# Patient Record
Sex: MALE | Race: WHITE | Employment: FULL TIME | URBAN - METROPOLITAN AREA
[De-identification: names, ages, dates, MRNs, and addresses within clinical notes are randomized per-mention and may not be internally consistent; named-entity substitution may affect disease eponyms.]

---

## 2017-11-25 ENCOUNTER — ANESTHESIA EVENT (OUTPATIENT)
Dept: OPERATING ROOM | Age: 42
End: 2017-11-25
Payer: COMMERCIAL

## 2017-11-27 ENCOUNTER — ANESTHESIA (OUTPATIENT)
Dept: OPERATING ROOM | Age: 42
End: 2017-11-27
Payer: COMMERCIAL

## 2017-11-27 ENCOUNTER — HOSPITAL ENCOUNTER (OUTPATIENT)
Age: 42
Setting detail: OUTPATIENT SURGERY
Discharge: HOME | End: 2017-11-27
Attending: SURGERY | Admitting: SURGERY
Payer: COMMERCIAL

## 2017-11-27 VITALS — TEMPERATURE: 96.4 F | DIASTOLIC BLOOD PRESSURE: 57 MMHG | OXYGEN SATURATION: 97 % | SYSTOLIC BLOOD PRESSURE: 100 MMHG

## 2017-11-27 VITALS
WEIGHT: 212 LBS | DIASTOLIC BLOOD PRESSURE: 78 MMHG | TEMPERATURE: 97.1 F | RESPIRATION RATE: 16 BRPM | HEART RATE: 87 BPM | SYSTOLIC BLOOD PRESSURE: 128 MMHG | OXYGEN SATURATION: 95 % | HEIGHT: 72 IN | BODY MASS INDEX: 28.71 KG/M2

## 2017-11-27 PROCEDURE — C1781 MESH (IMPLANTABLE): HCPCS | Performed by: SURGERY

## 2017-11-27 PROCEDURE — 7100000030 HC ASPR PHASE II RECOVERY - FIRST 15 MIN: Performed by: SURGERY

## 2017-11-27 PROCEDURE — 2580000003 HC RX 258: Performed by: NURSE ANESTHETIST, CERTIFIED REGISTERED

## 2017-11-27 PROCEDURE — 6360000002 HC RX W HCPCS: Performed by: NURSE ANESTHETIST, CERTIFIED REGISTERED

## 2017-11-27 PROCEDURE — 7100000001 HC PACU RECOVERY - ADDTL 15 MIN: Performed by: SURGERY

## 2017-11-27 PROCEDURE — 7100000031 HC ASPR PHASE II RECOVERY - ADDTL 15 MIN: Performed by: SURGERY

## 2017-11-27 PROCEDURE — 88302 TISSUE EXAM BY PATHOLOGIST: CPT

## 2017-11-27 PROCEDURE — 6370000000 HC RX 637 (ALT 250 FOR IP): Performed by: ANESTHESIOLOGY

## 2017-11-27 PROCEDURE — 6360000002 HC RX W HCPCS: Performed by: ANESTHESIOLOGY

## 2017-11-27 PROCEDURE — A6258 TRANSPARENT FILM >16<=48 IN: HCPCS | Performed by: SURGERY

## 2017-11-27 PROCEDURE — 3600000002 HC SURGERY LEVEL 2 BASE: Performed by: SURGERY

## 2017-11-27 PROCEDURE — 3700000001 HC ADD 15 MINUTES (ANESTHESIA): Performed by: SURGERY

## 2017-11-27 PROCEDURE — A6402 STERILE GAUZE <= 16 SQ IN: HCPCS | Performed by: SURGERY

## 2017-11-27 PROCEDURE — 3700000000 HC ANESTHESIA ATTENDED CARE: Performed by: SURGERY

## 2017-11-27 PROCEDURE — 3600000012 HC SURGERY LEVEL 2 ADDTL 15MIN: Performed by: SURGERY

## 2017-11-27 PROCEDURE — 2500000003 HC RX 250 WO HCPCS: Performed by: SURGERY

## 2017-11-27 PROCEDURE — 2500000003 HC RX 250 WO HCPCS: Performed by: NURSE ANESTHETIST, CERTIFIED REGISTERED

## 2017-11-27 PROCEDURE — 2580000003 HC RX 258: Performed by: ANESTHESIOLOGY

## 2017-11-27 PROCEDURE — 7100000000 HC PACU RECOVERY - FIRST 15 MIN: Performed by: SURGERY

## 2017-11-27 DEVICE — MESH SURG DIA4.6CM POLY PGLA CLLGN FLM RIG ABSRB EXP SEMI: Type: IMPLANTABLE DEVICE | Site: UMBILICAL | Status: FUNCTIONAL

## 2017-11-27 RX ORDER — LIDOCAINE HYDROCHLORIDE 10 MG/ML
INJECTION, SOLUTION EPIDURAL; INFILTRATION; INTRACAUDAL; PERINEURAL PRN
Status: DISCONTINUED | OUTPATIENT
Start: 2017-11-27 | End: 2017-11-27 | Stop reason: SDUPTHER

## 2017-11-27 RX ORDER — MORPHINE SULFATE 10 MG/ML
1 INJECTION, SOLUTION INTRAMUSCULAR; INTRAVENOUS EVERY 5 MIN PRN
Status: DISCONTINUED | OUTPATIENT
Start: 2017-11-27 | End: 2017-11-27 | Stop reason: HOSPADM

## 2017-11-27 RX ORDER — MIDAZOLAM HYDROCHLORIDE 1 MG/ML
INJECTION INTRAMUSCULAR; INTRAVENOUS PRN
Status: DISCONTINUED | OUTPATIENT
Start: 2017-11-27 | End: 2017-11-27 | Stop reason: SDUPTHER

## 2017-11-27 RX ORDER — MEPERIDINE HYDROCHLORIDE 50 MG/ML
12.5 INJECTION INTRAMUSCULAR; INTRAVENOUS; SUBCUTANEOUS EVERY 5 MIN PRN
Status: DISCONTINUED | OUTPATIENT
Start: 2017-11-27 | End: 2017-11-27 | Stop reason: HOSPADM

## 2017-11-27 RX ORDER — LABETALOL HYDROCHLORIDE 5 MG/ML
5 INJECTION, SOLUTION INTRAVENOUS EVERY 10 MIN PRN
Status: DISCONTINUED | OUTPATIENT
Start: 2017-11-27 | End: 2017-11-27 | Stop reason: HOSPADM

## 2017-11-27 RX ORDER — FENTANYL CITRATE 50 UG/ML
50 INJECTION, SOLUTION INTRAMUSCULAR; INTRAVENOUS EVERY 5 MIN PRN
Status: DISCONTINUED | OUTPATIENT
Start: 2017-11-27 | End: 2017-11-27 | Stop reason: HOSPADM

## 2017-11-27 RX ORDER — ONDANSETRON 2 MG/ML
4 INJECTION INTRAMUSCULAR; INTRAVENOUS
Status: COMPLETED | OUTPATIENT
Start: 2017-11-27 | End: 2017-11-27

## 2017-11-27 RX ORDER — HYDRALAZINE HYDROCHLORIDE 20 MG/ML
5 INJECTION INTRAMUSCULAR; INTRAVENOUS EVERY 10 MIN PRN
Status: DISCONTINUED | OUTPATIENT
Start: 2017-11-27 | End: 2017-11-27 | Stop reason: HOSPADM

## 2017-11-27 RX ORDER — SODIUM CHLORIDE, SODIUM LACTATE, POTASSIUM CHLORIDE, CALCIUM CHLORIDE 600; 310; 30; 20 MG/100ML; MG/100ML; MG/100ML; MG/100ML
INJECTION, SOLUTION INTRAVENOUS CONTINUOUS PRN
Status: DISCONTINUED | OUTPATIENT
Start: 2017-11-27 | End: 2017-11-27 | Stop reason: SDUPTHER

## 2017-11-27 RX ORDER — DIPHENHYDRAMINE HYDROCHLORIDE 50 MG/ML
12.5 INJECTION INTRAMUSCULAR; INTRAVENOUS
Status: DISCONTINUED | OUTPATIENT
Start: 2017-11-27 | End: 2017-11-27 | Stop reason: HOSPADM

## 2017-11-27 RX ORDER — HYDROCODONE BITARTRATE AND ACETAMINOPHEN 5; 325 MG/1; MG/1
2 TABLET ORAL PRN
Status: DISCONTINUED | OUTPATIENT
Start: 2017-11-27 | End: 2017-11-27 | Stop reason: HOSPADM

## 2017-11-27 RX ORDER — METOCLOPRAMIDE HYDROCHLORIDE 5 MG/ML
10 INJECTION INTRAMUSCULAR; INTRAVENOUS
Status: DISCONTINUED | OUTPATIENT
Start: 2017-11-27 | End: 2017-11-27 | Stop reason: HOSPADM

## 2017-11-27 RX ORDER — DOCUSATE SODIUM 100 MG/1
100 CAPSULE, LIQUID FILLED ORAL 2 TIMES DAILY
Qty: 30 CAPSULE | Refills: 0 | Status: SHIPPED | OUTPATIENT
Start: 2017-11-27

## 2017-11-27 RX ORDER — SODIUM CHLORIDE 0.9 % (FLUSH) 0.9 %
10 SYRINGE (ML) INJECTION EVERY 12 HOURS SCHEDULED
Status: DISCONTINUED | OUTPATIENT
Start: 2017-11-27 | End: 2017-11-27 | Stop reason: HOSPADM

## 2017-11-27 RX ORDER — DEXAMETHASONE SODIUM PHOSPHATE 4 MG/ML
INJECTION, SOLUTION INTRA-ARTICULAR; INTRALESIONAL; INTRAMUSCULAR; INTRAVENOUS; SOFT TISSUE PRN
Status: DISCONTINUED | OUTPATIENT
Start: 2017-11-27 | End: 2017-11-27 | Stop reason: SDUPTHER

## 2017-11-27 RX ORDER — ONDANSETRON 2 MG/ML
INJECTION INTRAMUSCULAR; INTRAVENOUS PRN
Status: DISCONTINUED | OUTPATIENT
Start: 2017-11-27 | End: 2017-11-27 | Stop reason: SDUPTHER

## 2017-11-27 RX ORDER — SODIUM CHLORIDE, SODIUM LACTATE, POTASSIUM CHLORIDE, CALCIUM CHLORIDE 600; 310; 30; 20 MG/100ML; MG/100ML; MG/100ML; MG/100ML
INJECTION, SOLUTION INTRAVENOUS CONTINUOUS
Status: DISCONTINUED | OUTPATIENT
Start: 2017-11-27 | End: 2017-11-27 | Stop reason: HOSPADM

## 2017-11-27 RX ORDER — PROPOFOL 10 MG/ML
INJECTION, EMULSION INTRAVENOUS PRN
Status: DISCONTINUED | OUTPATIENT
Start: 2017-11-27 | End: 2017-11-27 | Stop reason: SDUPTHER

## 2017-11-27 RX ORDER — SCOLOPAMINE TRANSDERMAL SYSTEM 1 MG/1
1 PATCH, EXTENDED RELEASE TRANSDERMAL
Status: DISCONTINUED | OUTPATIENT
Start: 2017-11-27 | End: 2017-11-27 | Stop reason: HOSPADM

## 2017-11-27 RX ORDER — BUPIVACAINE HYDROCHLORIDE 5 MG/ML
INJECTION, SOLUTION EPIDURAL; INTRACAUDAL PRN
Status: DISCONTINUED | OUTPATIENT
Start: 2017-11-27 | End: 2017-11-27 | Stop reason: HOSPADM

## 2017-11-27 RX ORDER — OXYCODONE HYDROCHLORIDE AND ACETAMINOPHEN 5; 325 MG/1; MG/1
1 TABLET ORAL EVERY 6 HOURS PRN
Qty: 30 TABLET | Refills: 0 | Status: SHIPPED | OUTPATIENT
Start: 2017-11-27 | End: 2017-12-04

## 2017-11-27 RX ORDER — HYDROCODONE BITARTRATE AND ACETAMINOPHEN 5; 325 MG/1; MG/1
1 TABLET ORAL PRN
Status: DISCONTINUED | OUTPATIENT
Start: 2017-11-27 | End: 2017-11-27 | Stop reason: HOSPADM

## 2017-11-27 RX ORDER — CEFAZOLIN SODIUM 1 G/3ML
INJECTION, POWDER, FOR SOLUTION INTRAMUSCULAR; INTRAVENOUS PRN
Status: DISCONTINUED | OUTPATIENT
Start: 2017-11-27 | End: 2017-11-27 | Stop reason: SDUPTHER

## 2017-11-27 RX ORDER — HYDROMORPHONE HCL 110MG/55ML
0.5 PATIENT CONTROLLED ANALGESIA SYRINGE INTRAVENOUS EVERY 5 MIN PRN
Status: DISCONTINUED | OUTPATIENT
Start: 2017-11-27 | End: 2017-11-27 | Stop reason: HOSPADM

## 2017-11-27 RX ORDER — FENTANYL CITRATE 50 UG/ML
INJECTION, SOLUTION INTRAMUSCULAR; INTRAVENOUS PRN
Status: DISCONTINUED | OUTPATIENT
Start: 2017-11-27 | End: 2017-11-27 | Stop reason: SDUPTHER

## 2017-11-27 RX ORDER — FENTANYL CITRATE 50 UG/ML
25 INJECTION, SOLUTION INTRAMUSCULAR; INTRAVENOUS EVERY 5 MIN PRN
Status: DISCONTINUED | OUTPATIENT
Start: 2017-11-27 | End: 2017-11-27 | Stop reason: HOSPADM

## 2017-11-27 RX ORDER — SODIUM CHLORIDE 0.9 % (FLUSH) 0.9 %
10 SYRINGE (ML) INJECTION PRN
Status: DISCONTINUED | OUTPATIENT
Start: 2017-11-27 | End: 2017-11-27 | Stop reason: HOSPADM

## 2017-11-27 RX ADMIN — FENTANYL CITRATE 50 MCG: 50 INJECTION, SOLUTION INTRAMUSCULAR; INTRAVENOUS at 13:20

## 2017-11-27 RX ADMIN — SODIUM CHLORIDE, POTASSIUM CHLORIDE, SODIUM LACTATE AND CALCIUM CHLORIDE: 600; 310; 30; 20 INJECTION, SOLUTION INTRAVENOUS at 13:14

## 2017-11-27 RX ADMIN — FENTANYL CITRATE 25 MCG: 50 INJECTION, SOLUTION INTRAMUSCULAR; INTRAVENOUS at 14:36

## 2017-11-27 RX ADMIN — FENTANYL CITRATE 50 MCG: 50 INJECTION, SOLUTION INTRAMUSCULAR; INTRAVENOUS at 14:50

## 2017-11-27 RX ADMIN — PROPOFOL 200 MG: 10 INJECTION, EMULSION INTRAVENOUS at 13:16

## 2017-11-27 RX ADMIN — LIDOCAINE HYDROCHLORIDE 50 MG: 10 INJECTION, SOLUTION EPIDURAL; INFILTRATION; INTRACAUDAL; PERINEURAL at 13:16

## 2017-11-27 RX ADMIN — MIDAZOLAM 2 MG: 1 INJECTION INTRAMUSCULAR; INTRAVENOUS at 13:14

## 2017-11-27 RX ADMIN — CEFAZOLIN 2000 MG: 1 INJECTION, POWDER, FOR SOLUTION INTRAMUSCULAR; INTRAVENOUS at 13:22

## 2017-11-27 RX ADMIN — ONDANSETRON 4 MG: 2 INJECTION INTRAMUSCULAR; INTRAVENOUS at 13:44

## 2017-11-27 RX ADMIN — FENTANYL CITRATE 50 MCG: 50 INJECTION, SOLUTION INTRAMUSCULAR; INTRAVENOUS at 13:16

## 2017-11-27 RX ADMIN — DEXAMETHASONE SODIUM PHOSPHATE 4 MG: 4 INJECTION, SOLUTION INTRAMUSCULAR; INTRAVENOUS at 13:44

## 2017-11-27 RX ADMIN — SODIUM CHLORIDE, POTASSIUM CHLORIDE, SODIUM LACTATE AND CALCIUM CHLORIDE: 600; 310; 30; 20 INJECTION, SOLUTION INTRAVENOUS at 11:52

## 2017-11-27 RX ADMIN — ONDANSETRON 4 MG: 2 INJECTION INTRAMUSCULAR; INTRAVENOUS at 14:35

## 2017-11-27 ASSESSMENT — PAIN DESCRIPTION - PAIN TYPE
TYPE: SURGICAL PAIN
TYPE: SURGICAL PAIN

## 2017-11-27 ASSESSMENT — PAIN SCALES - GENERAL
PAINLEVEL_OUTOF10: 7
PAINLEVEL_OUTOF10: 5
PAINLEVEL_OUTOF10: 6

## 2017-11-27 ASSESSMENT — PAIN DESCRIPTION - LOCATION
LOCATION: ABDOMEN;UMBILICUS
LOCATION: UMBILICUS

## 2017-11-27 ASSESSMENT — PAIN - FUNCTIONAL ASSESSMENT: PAIN_FUNCTIONAL_ASSESSMENT: 0-10

## 2017-11-27 ASSESSMENT — PAIN DESCRIPTION - DESCRIPTORS: DESCRIPTORS: ACHING;DULL

## 2017-11-27 NOTE — ANESTHESIA PRE PROCEDURE
Department of Anesthesiology  Preprocedure Note       Name:  Killian Panda   Age:  43 y.o.  :  1975                                          MRN:  [id number]         Date:  2017      Surgeon: Rylan Lewis):  Vinicio Llanos DO    Procedure: Procedure(s): HERNIA UMBILICAL REPAIR W/MESH    Medications prior to admission:   Prior to Admission medications    Not on File       Current medications:    Current Facility-Administered Medications   Medication Dose Route Frequency Provider Last Rate Last Dose    lactated ringers infusion   Intravenous Continuous Adam Reyes  mL/hr at 17 1152      sodium chloride flush 0.9 % injection 10 mL  10 mL Intravenous 2 times per day Adam Reyes MD        sodium chloride flush 0.9 % injection 10 mL  10 mL Intravenous PRN Adam Reyes MD           Allergies: Allergies   Allergen Reactions    Gluten Meal     Motrin [Ibuprofen] Swelling       Problem List:  There is no problem list on file for this patient.       Past Medical History:        Diagnosis Date    Kidney stones     history of    Umbilical hernia        Past Surgical History:        Procedure Laterality Date    TONSILLECTOMY         Social History:    Social History   Substance Use Topics    Smoking status: Never Smoker    Smokeless tobacco: Never Used    Alcohol use No                                Counseling given: Not Answered      Vital Signs (Current):   Vitals:    17 1140   BP: 136/87   Pulse: 89   Resp: 18   Temp: 97 °F (36.1 °C)   SpO2: 96%   Weight: 212 lb (96.2 kg)   Height: 6' (1.829 m)                                              BP Readings from Last 3 Encounters:   17 136/87       NPO Status: Time of last liquid consumption: 2200                        Time of last solid consumption: 220                        Date of last liquid consumption: 17                        Date of last solid food consumption: 17    BMI:   Wt Readings from Last 3 Encounters:   17

## 2017-11-27 NOTE — H&P
HISTORY and [facility] [id number]       NAME:  Mendez Bergeron  MRN: [id number]   YOB: 1975   Date: 11/27/2017   Age: 43 y.o. Gender: male       COMPLAINT AND PRESENT HISTORY:   43 y o male with umbilical hernia. Patient had episode of apparent \"GI swelling\" about one month ago that was thought to be a reaction from Motrin which patient was taking because of strained neck muscle. There was major pain around the umbilicus. Pain lasted about 3 weeks and was about 7 on scale. Pain worse with moving or twisting and persists. Pain would wake him in the missle of the night. PAST MEDICAL HISTORY     Past Medical History:   Diagnosis Date    Kidney stones     history of    Umbilical hernia        Pt denies any history of Diabetes mellitus type 2, hypertension, stroke, heart disease, COPD, Asthma, GERD, HLD, Cancer, Seizures,Thyroid disease, Kidney Disease, Hepatitis, TB, Psychiatric Disorders or Substance abuse. SURGICAL HISTORY       Past Surgical History:   Procedure Laterality Date    TONSILLECTOMY         FAMILY HISTORY     History reviewed. No pertinent family history. SOCIAL HISTORY       Social History     Social History    Marital status:      Spouse name: N/A    Number of children: N/A    Years of education: N/A     Social History Main Topics    Smoking status: Never Smoker    Smokeless tobacco: Never Used    Alcohol use No    Drug use: No    Sexual activity: Not Asked     Other Topics Concern    None     Social History Narrative    None           REVIEW OF SYSTEMS      Allergies   Allergen Reactions    Gluten Meal     Motrin [Ibuprofen] Swelling       No current facility-administered medications on file prior to encounter. No current outpatient prescriptions on file prior to encounter. General health:  Fairly good. No fever or chills. Skin:  No itching, redness or rash.                  HEENT:  No headache, epistaxis no pedal edema. Analis sign negative. No discoloration or ulcerations. NEUROLOGIC:  The patient is conscious, alert, oriented, No apparent focal sensory or motor deficits. PROVISIONAL DIAGNOSES / SURGERY:      Umbilical hernia  Umbilical hernia repair W/ mesh    There are no active problems to display for this patient.           Tea Roy NP on 11/27/2017 at 12:04 PM

## 2017-11-27 NOTE — OP NOTE
OPERATIVE NOTE    DATE OF PROCEDURE: 11/27/2017     SURGEON:Jodie Whitfield DO    PREOPERATIVE DIAGNOSIS : initial umbilical  Hernia reducible    POSTOPERATIVE DIAGNOSIS: Same     OPERATION: umbilical hernia repair with mesh    ANESTHESIA: General anesthesia    ESTIMATED BLOOD LOSS:  less than 5     COMPLICATIONS: None. SPECIMENS:  Was Obtained: hernia and sac    HISTORY: The patient is a 43y.o. year old male with history of above preop diagnosis. I explained the risk, benefits, expected outcome, and alternatives to the procedure. Patient understands and is in agreement. PROCEDURE: The patient was given general anesthesia. The abdomen was prepped and draped in typical sterile fashion. Incision is made inferior to the umbilicus and dissection is taken down with electro bovie cautery to the hernia sac. The hernia sac is opened and removed and sent for specimen. The adhesions are taken down sharply. The parietex ventral hernia circular mesh small is dipped in saline and inserted into the preperitoneal space. The mesh is sutured into place with interrupted O-prolene suture. The fascia is closed over the mesh with 0-Vicryl in figure of 8 sutures The skin is closed with 4-0 Vicryl in interrupted fashion. Steri strips and a sterile dressing are applied. The patient is awakened and extubated and taken to the recovery room in stable condition.     Electronically signed by Jeromy Ortiz DO on 11/27/2017 at 2:03 PM

## 2017-11-28 LAB — SURGICAL PATHOLOGY REPORT: NORMAL

## 2019-02-19 ENCOUNTER — OFFICE VISIT (OUTPATIENT)
Dept: ORTHOPEDIC SURGERY | Age: 44
End: 2019-02-19
Payer: COMMERCIAL

## 2019-02-19 VITALS — HEIGHT: 72 IN | BODY MASS INDEX: 28.73 KG/M2 | WEIGHT: 212.08 LBS

## 2019-02-19 DIAGNOSIS — S52.572A OTHER CLOSED INTRA-ARTICULAR FRACTURE OF DISTAL END OF LEFT RADIUS, INITIAL ENCOUNTER: Primary | ICD-10-CM

## 2019-02-19 PROBLEM — S52.502A CLOSED FRACTURE OF LEFT DISTAL RADIUS: Status: ACTIVE | Noted: 2019-02-19

## 2019-02-19 PROCEDURE — 99203 OFFICE O/P NEW LOW 30 MIN: CPT | Performed by: ORTHOPAEDIC SURGERY

## 2019-02-19 PROCEDURE — 25600 CLTX DST RDL FX/EPHYS SEP WO: CPT | Performed by: ORTHOPAEDIC SURGERY

## 2019-02-19 RX ORDER — TRAMADOL HYDROCHLORIDE 50 MG/1
TABLET ORAL
Refills: 0 | COMMUNITY
Start: 2019-02-17

## 2019-02-25 DIAGNOSIS — S52.502D CLOSED FRACTURE OF DISTAL END OF LEFT RADIUS WITH ROUTINE HEALING, UNSPECIFIED FRACTURE MORPHOLOGY, SUBSEQUENT ENCOUNTER: Primary | ICD-10-CM

## 2019-02-26 ENCOUNTER — OFFICE VISIT (OUTPATIENT)
Dept: ORTHOPEDIC SURGERY | Age: 44
End: 2019-02-26

## 2019-02-26 VITALS — WEIGHT: 212.08 LBS | HEIGHT: 72 IN | BODY MASS INDEX: 28.73 KG/M2

## 2019-02-26 DIAGNOSIS — S52.532D CLOSED COLLES' FRACTURE OF LEFT RADIUS WITH ROUTINE HEALING, SUBSEQUENT ENCOUNTER: Primary | ICD-10-CM

## 2019-02-26 PROCEDURE — 99024 POSTOP FOLLOW-UP VISIT: CPT | Performed by: ORTHOPAEDIC SURGERY

## 2019-02-26 RX ORDER — TRAMADOL HYDROCHLORIDE 50 MG/1
50 TABLET ORAL EVERY 6 HOURS PRN
Qty: 20 TABLET | Refills: 0 | Status: SHIPPED | OUTPATIENT
Start: 2019-02-26 | End: 2019-03-03

## 2019-03-04 DIAGNOSIS — S62.102D CLOSED FRACTURE OF LEFT WRIST WITH ROUTINE HEALING, SUBSEQUENT ENCOUNTER: Primary | ICD-10-CM

## 2019-03-05 ENCOUNTER — OFFICE VISIT (OUTPATIENT)
Dept: ORTHOPEDIC SURGERY | Age: 44
End: 2019-03-05

## 2019-03-05 VITALS — HEIGHT: 72 IN | BODY MASS INDEX: 28.73 KG/M2 | WEIGHT: 212.08 LBS

## 2019-03-05 DIAGNOSIS — S52.532D CLOSED COLLES' FRACTURE OF LEFT RADIUS WITH ROUTINE HEALING, SUBSEQUENT ENCOUNTER: Primary | ICD-10-CM

## 2019-03-05 PROCEDURE — 99024 POSTOP FOLLOW-UP VISIT: CPT | Performed by: ORTHOPAEDIC SURGERY

## 2019-03-27 ENCOUNTER — OFFICE VISIT (OUTPATIENT)
Dept: ORTHOPEDIC SURGERY | Age: 44
End: 2019-03-27

## 2019-03-27 DIAGNOSIS — S62.102D CLOSED FRACTURE OF LEFT WRIST WITH ROUTINE HEALING, SUBSEQUENT ENCOUNTER: Primary | ICD-10-CM

## 2019-03-27 PROCEDURE — 99024 POSTOP FOLLOW-UP VISIT: CPT | Performed by: ORTHOPAEDIC SURGERY

## 2019-04-03 DIAGNOSIS — S62.102D CLOSED FRACTURE OF LEFT WRIST WITH ROUTINE HEALING, SUBSEQUENT ENCOUNTER: Primary | ICD-10-CM

## 2019-04-04 ENCOUNTER — OFFICE VISIT (OUTPATIENT)
Dept: ORTHOPEDIC SURGERY | Age: 44
End: 2019-04-04

## 2019-04-04 VITALS — HEIGHT: 72 IN | WEIGHT: 212.08 LBS | BODY MASS INDEX: 28.73 KG/M2

## 2019-04-04 DIAGNOSIS — S52.532D CLOSED COLLES' FRACTURE OF LEFT RADIUS WITH ROUTINE HEALING, SUBSEQUENT ENCOUNTER: Primary | ICD-10-CM

## 2019-04-04 PROCEDURE — 99024 POSTOP FOLLOW-UP VISIT: CPT | Performed by: ORTHOPAEDIC SURGERY

## 2019-04-04 NOTE — PROGRESS NOTES
HPI: Yolie Craft is a 40 y.o. old male who is approximately 6 weeks out from a  Closed left distal radius fracture. Was in a short arm cast for a period of about 3 weeks. He was seen last week by Dr. Lucille Maki is a result of this cast irritating him. He had the cast removed and was placed in a short arm removable wrist splint. He states that he feels much better. At this time he reports having mild pain primarily along the ulnar aspect of the wrist.  He denies having any numbness or tingling. [facility] Physical Exam:  Ht 6' 0.01\" (1.829 m)   Wt 212 lb 1.3 oz (96.2 kg)   BMI 28.76 kg/m²   General Appearance: alert, well appearing, and in no distress  Mental Status: alert, oriented to person, place, and time  Evaluation of the left wrist and upper extremity demonstrates moderate diffuse swelling about the wrist. Skin is intact without any warmth or erythema. he does have some mild tenderness to palpation on the dorsum of the wrist. Sensation is grossly intact to light touch diffusely, and he can actively flex, extend, abduct and adduct all his fingers. Imaging Studies: X-rays of the left wrist were obtained during his visit last week was reviewed independently today demonstrating maintained alignment of the left distal radius with no interval displacement. There appears to be consolidation across the fracture site. Impression and plan: Yolie Craft is a 40 y.o. old male who is approximately 6 weeks out from a closed left distal radius fracture. We've been treating this conservatively thus far and has been in a short arm cast for 3 weeks after being in the splint for 2. He has been in a removable wrist splint for the past week. As noted above he remains in acceptable alignment and there appears to be appropriate healing across the fracture. Consequently we'll continue with conservative management. To this end I'll have him remain in his removable splint for the next 6 weeks.  he may take this off for hygiene and to work on gradual progressive range of motion. he was set up with occupational therapy to facilitate this. he is to avoid any heavy lifting, pushing, or pulling. I'll have him follow-up in my clinic in 6 weeks for reevaluation but he was instructed to return or call earlier with any questions and/or concerns.

## 2019-05-01 DIAGNOSIS — S62.102D CLOSED FRACTURE OF LEFT WRIST WITH ROUTINE HEALING, SUBSEQUENT ENCOUNTER: Primary | ICD-10-CM

## 2019-05-13 ENCOUNTER — OFFICE VISIT (OUTPATIENT)
Dept: ORTHOPEDIC SURGERY | Age: 44
End: 2019-05-13

## 2019-05-13 DIAGNOSIS — S52.532D CLOSED COLLES' FRACTURE OF LEFT RADIUS WITH ROUTINE HEALING, SUBSEQUENT ENCOUNTER: Primary | ICD-10-CM

## 2019-05-13 PROCEDURE — 99024 POSTOP FOLLOW-UP VISIT: CPT | Performed by: ORTHOPAEDIC SURGERY

## 2024-01-03 ENCOUNTER — HOSPITAL ENCOUNTER (OUTPATIENT)
Age: 49
Setting detail: THERAPIES SERIES
Discharge: HOME | End: 2024-01-03
Payer: COMMERCIAL

## 2024-01-03 PROCEDURE — 97161 PT EVAL LOW COMPLEX 20 MIN: CPT

## 2024-01-03 PROCEDURE — 97110 THERAPEUTIC EXERCISES: CPT

## 2024-01-03 NOTE — CONSULTS
[facility]  [address], [address]  [place] [address]  [phone]  F: [phone]   Physical Therapy Spine Evaluation    Date:  1/3/2024  Patient: Fuad Neff  : 1975  MRN: [id number]  Physician: Bry Moura DO     Insurance: [employer] 60/60 VISITS $30.00 COPAYMENT   Medical Diagnosis: low back pain without sciatica M54.50 ADD neck pain M54.2  Rehab Codes: low back pain M54.5  Onset Date: referral 23  Next 's appt.: PRN after therapy  Visit Count:    Cancel/No Show: 0/0    Subjective:   Patient presents to therapy with complaints of low back pain with complaints of (L) sided SI and (L) sided gluteal symptoms.  Patient also previously noted complaints of symptoms down the (L) LE to the (L) anterior femur.  This occurred after a fall of a ladder 22- stated that he had neck issues with radicular symptoms down the arm as well as back pain that started around that time.  Fell from ladder ~ 3 rungs up, grabbed with the (R) arm and broke the rung he was standing on- ended up landing onto the (R) shoulder are per the patient.  Has had previous testing on the neck per the patient but has not had MRI or XRAYs on the low back.  Does note back had previous issue with degenerative issues L5-S1 ~15 years ago but has been controlled/managed.  Stated that the neck was so bad initially that he did not seek treatment for the back.  Now has been noting that the back has been getting worse and this prompted MD appointment and referral to therapy 23.  Also asked about treatment for the neck- which he has previously done- and requesting this be added to the POC.  CC: complaints of low back pain, (L) SI pain, (L) iliac pain; previously noted radicular symptoms to the (L) anterior thigh described as numbness  HPI: injury in 2022, referral to therapy 23    PMHx: [] Unremarkable [] Diabetes [] HTN  [] Pacemaker   [] MI/Heart Problems []

## 2024-01-08 ENCOUNTER — HOSPITAL ENCOUNTER (OUTPATIENT)
Age: 49
Setting detail: THERAPIES SERIES
Discharge: HOME | End: 2024-01-08
Payer: COMMERCIAL

## 2024-01-08 PROCEDURE — 97110 THERAPEUTIC EXERCISES: CPT

## 2024-01-08 PROCEDURE — 97140 MANUAL THERAPY 1/> REGIONS: CPT

## 2024-01-08 NOTE — FLOWSHEET NOTE
Demonstrates/verbalizes understanding of HEP/Ed previously given.     See exercise sheet for complete review of HEP 1/3/24 Jro    Plan: [x] Continue current frequency toward long and short term goals.    [x] Specific Instructions for subsequent treatments: progress postural stabilization program, work on gluteal and hip stretching program, work on postural education/awareness for lumbar spine     Frequency:  2-3 x/week for 12 visits       Time In: 1500            Time Out: 1540    Electronically signed by:  Jose Jack PTA

## 2024-01-12 ENCOUNTER — HOSPITAL ENCOUNTER (OUTPATIENT)
Age: 49
Setting detail: THERAPIES SERIES
Discharge: HOME | End: 2024-01-12
Payer: COMMERCIAL

## 2024-01-12 PROCEDURE — 97140 MANUAL THERAPY 1/> REGIONS: CPT

## 2024-01-12 PROCEDURE — 97110 THERAPEUTIC EXERCISES: CPT

## 2024-01-12 NOTE — FLOWSHEET NOTE
testing.    Gave patient more stretching based program due to possible (R) levator muscle issue; possible mild disc issue due to infrequent radicular symptoms    Precautions:          Today’s Treatment:    Modalities:     Precautions:  Exercises:  Exercise Reps/ Time Weight/ Level Comments             Postural education  5 min    Lumbar roll   Hamstring stretch supine 4 x 20\"       Calf stretch  4 x 20\"      Piriformis stretch supine 3 x 20\"       Bridge  x 10        Hook lying march  X 15   Hip flexion with knee extension TA iso   BRIDGES  X15 red    March supine  X15 red          Cervical side bend 3 x 20\"     Levator stretch 3 x 20\"     Doorway rhomboid stretch 3 x 20\"           Left hip PROM X 10   Asymptomatic with minimal ROM loss   STM left buttock/piriformis 3 min   Percussive  STM small ball intensity 2   L1-L5 CPA  10\" x 5   Grade IV    HEMANT 2 min   HELD TODAY-IRRITATES (R) NECK SYMPTOMS     Other:    Response to treatment:  Pt  evaluated cervical symptoms with signed POC from physician.  Presents as mild cervical derangement or muscle issue of (R) trapezius and (R) levator.  Patient given stretching program for neck as listed.  Tolerated these well.  Did lumbar program first and held the HEMANT due to increased (R) scapular and (R) levator symptoms with this.  Otherwise tolerated this program well including addition of supine stabilization exercises for the back.    Treatment Charges: Mins Units   []  Modalities     [x]  Ther Exercise 30 2   [x]  Manual Therapy 10 1   []  Ther Activities     []  Neuro Re-ed     []  Vasocompression     [] Gait     []  Other     Total billable time 40 3       Assessment: [x] Progressing toward goals.  Presents as cervical muscle issue and possible mild derangement of cervical spine.  Presents as possible L3 radicular issue with secondary (L) SI pain.    [] No change.     [] Other:  [x] Patient would continue to benefit from skilled physical therapy services in order to work on

## 2024-01-15 ENCOUNTER — APPOINTMENT (OUTPATIENT)
Age: 49
End: 2024-01-15
Payer: COMMERCIAL

## 2024-01-16 ENCOUNTER — HOSPITAL ENCOUNTER (OUTPATIENT)
Age: 49
Setting detail: THERAPIES SERIES
Discharge: HOME | End: 2024-01-16
Payer: COMMERCIAL

## 2024-01-16 PROCEDURE — 97140 MANUAL THERAPY 1/> REGIONS: CPT

## 2024-01-16 PROCEDURE — 97110 THERAPEUTIC EXERCISES: CPT

## 2024-01-16 NOTE — FLOWSHEET NOTE
[facility]  Therapy  [address], [address]  [place] [address]  [phone]  F: [phone]     Physical Therapy Daily Treatment Note    Date:  2024  Patient Name:  Fuad Neff    :  1975  MRN: [id number]  Physician: Bry Moura DO                                     Insurance: [facility] 60/60 VISITS $30.00 COPAYMENT   Medical Diagnosis: low back pain without sciatica M54.50 ADD neck pain M54.2             Rehab Codes: low back pain M54.5  Onset Date: referral 23             Next 's appt.: PRN after therapy    Visit# / total visits:     Cancels/No Shows: 0/0    Subjective:    Pain:  [x] Yes  [] No Location: left low back and buttock 5/10, ; (R) trapezius/levator  Pain Rating: (0-10 scale) 5/10  Pain altered Tx:  [x] No  [] Yes  Action:    Comments:   Pt states that his low back and right UE are more symptomatic due to slipping and falling on the ice yesterday.    Reached forward earlier today to put his right shoe on producing increased left low back and buttock pain.  Right UE sore from fall, sx are not described as neurological more muscular in nature. Up to those events sx were decreasing in intensity and frequency.     Objective:  decreased lumbar lordosis noted L1-L4 with minimal intervertebral movement.          CERVICAL TESTING:  Previously documented:  (B) rotation, (R) 70 degrees, (L) 70 degrees with (R) sided discomfort/difficulty due to (R) trap discomfort  SB (R) 30 degrees, (L) 25 degrees- (R) sided discomfort with (B) testing- (R) results in \"pinching\" in (R) trap area  Flexion 50 degrees mild pull  Extension 50 degrees end range pull (R) trap area    Repeated testing-   Repeated protrusion- results in \"crunch\" in scapular area  Repeated retraction- pain in (R) neck,   Repeated flexion- mild pull in (R) neck  Did not get radicular symptoms with testing.    Gave patient more stretching based program due to possible (R)

## 2024-01-19 ENCOUNTER — HOSPITAL ENCOUNTER (OUTPATIENT)
Age: 49
Setting detail: THERAPIES SERIES
Discharge: HOME | End: 2024-01-19
Payer: COMMERCIAL

## 2024-01-19 PROCEDURE — 97140 MANUAL THERAPY 1/> REGIONS: CPT

## 2024-01-19 PROCEDURE — 97110 THERAPEUTIC EXERCISES: CPT

## 2024-01-19 NOTE — FLOWSHEET NOTE
[facility]  [address]  [place] [address]  [phone]  F: [phone]     Physical Therapy Daily Treatment Note    Date:  2024  Patient Name:  Fuad Neff    :  1975  MRN: [id number]  Physician: Bry Moura DO                                     [facility] $30.00 COPAYMENT   Medical Diagnosis: low back pain without sciatica M54.50 ADD neck pain M54.2             Rehab Codes: low back pain M54.5  Onset Date: referral 23             Next 's appt.: PRN after therapy    Visit# / total visits:     Cancels/No Shows: 0/0    Subjective:    Pain:  [x] Yes  [] No Location: left low back and buttock 5/10, ; (R) trapezius/levator  Pain Rating: (0-10 scale) 5/10  Pain altered Tx:  [x] No  [] Yes  Action:    Comments:   Pt with improved low back pain from the last treatment session.  Still with some soreness in back.  Combination of falling and then also prolonged driving per the patient.    Objective:  decreased lumbar lordosis noted L1-L4 with minimal intervertebral movement.          CERVICAL TESTING:  Previously documented:  (B) rotation, (R) 70 degrees, (L) 70 degrees with (R) sided discomfort/difficulty due to (R) trap discomfort  SB (R) 30 degrees, (L) 25 degrees- (R) sided discomfort with (B) testing- (R) results in \"pinching\" in (R) trap area  Flexion 50 degrees mild pull  Extension 50 degrees end range pull (R) trap area    Precautions:          Today’s Treatment:    Modalities: N/A    Precautions: N/A    Exercises:   Exercise Reps/ Time Weight/ Level Comments             Postural education  5 min    Lumbar roll   Hamstring stretch supine 4 x 20\"       Calf stretch  4 x 20\"      Piriformis stretch supine 3 x 20\"       Bridge  x 10        Hook lying march  X 15  red Hip flexion with knee extension TA iso   BRIDGES  X15 red    March supine  X15 red          Cervical side bend 3 x 20\"     Levator

## 2024-01-22 ENCOUNTER — HOSPITAL ENCOUNTER (OUTPATIENT)
Age: 49
Setting detail: THERAPIES SERIES
Discharge: HOME | End: 2024-01-22
Payer: COMMERCIAL

## 2024-01-22 NOTE — FLOWSHEET NOTE
[x][facility]  [address]  [place] [address]  [phone]  F: [phone] [] [facility]  [address]   P: [phone]  F: [phone]        Physical Therapy Cancel/No Show note    Date: 2024  Patient: Fuad Neff  : 1975  MRN: [id number]    Cancels/No Shows to date:     For today's appointment patient:    [x]  Cancelled    [] Rescheduled appointment    [] No-show     Reason given by patient:    [x]  Patient ill    []  Conflicting appointment    [] No transportation      [] Conflict with work    [] No reason given    [] Weather related    [] COVID-19    [] Other:      Comments:        [x] Next appointment was confirmed    Electronically signed by: Jose Jack PTA

## 2024-01-26 ENCOUNTER — HOSPITAL ENCOUNTER (OUTPATIENT)
Age: 49
Setting detail: THERAPIES SERIES
Discharge: HOME | End: 2024-01-26
Payer: COMMERCIAL

## 2024-01-26 ENCOUNTER — APPOINTMENT (OUTPATIENT)
Age: 49
End: 2024-01-26
Payer: COMMERCIAL

## 2024-01-26 PROCEDURE — 97140 MANUAL THERAPY 1/> REGIONS: CPT

## 2024-01-26 PROCEDURE — 97110 THERAPEUTIC EXERCISES: CPT

## 2024-01-26 NOTE — FLOWSHEET NOTE
[facility]  The[facility]  [address]  [place] [address]  [phone]  F: [phone]     Physical Therapy Daily Treatment Note    Date:  2024  Patient Name:  Fuad Neff    :  1975  MRN: [id number]  Physician: Bry Moura DO                                     Insurance: [facility] 60/60 VISITS $30.00 COPAYMENT   Medical Diagnosis: low back pain without sciatica M54.50 ADD neck pain M54.2             Rehab Codes: low back pain M54.5  Onset Date: referral 23             Next 's appt.: PRN after therapy    Visit# / total visits:     Cancels/No Shows: 1/0    Subjective:    Pain:  [x] Yes  [] No Location: left low back and buttock 5/10,  (R) trapezius/levator  Pain Rating: (0-10 scale) 6/10  (R) trapezius/levator   Pain altered Tx:  [x] No  [] Yes  Action:    Comments:   Pt reports that he was sick for the past couple of days.  States he is sore from coughing and laying around in bed.  Today, c/o right UT/scapular pain 6/10, Left low back ache/tightness 3/10.    Objective:  decreased lumbar lordosis noted L1-L4 with minimal intervertebral movement. Point-tenderness left piriformis right rhomboid, lateral scapular border.          CERVICAL TESTING:  Previously documented:  (B) rotation, (R) 70 degrees, (L) 70 degrees with (R) sided discomfort/difficulty due to (R) trap discomfort  SB (R) 30 degrees, (L) 25 degrees- (R) sided discomfort with (B) testing- (R) results in \"pinching\" in (R) trap area  Flexion 50 degrees mild pull  Extension 50 degrees end range pull (R) trap area    Precautions:          Today’s Treatment:    Modalities: N/A    Precautions: N/A    Manual therapy: IASTM    Pt completed Graston Technique Questioner and completed informed consent. Side effects and benefits of Graston Treatment were provided to the patient including post treatment soreness and bruising. Pt performed a warm up of UBE 6 min to increase blood flow

## 2024-01-29 ENCOUNTER — HOSPITAL ENCOUNTER (OUTPATIENT)
Age: 49
Setting detail: THERAPIES SERIES
Discharge: HOME | End: 2024-01-29
Payer: COMMERCIAL

## 2024-01-29 NOTE — FLOWSHEET NOTE
[facility]  Therapy  [address]  [place] [place] [address]  [phone]  F: [phone]     Physical Therapy Cancel/No Show note    Date: 2024  Patient: Fuad Neff  : 1975  MRN: [id number]    Cancels/No Shows to date:     For today's appointment patient:    [x]  Cancelled    [] Rescheduled appointment    [] No-show     Reason given by patient:    [x]  Patient ill    []  Conflicting appointment    [] No transportation      [] Conflict with work    [] No reason given    [] Weather related    [] COVID-19    [] Other:      Comments:        [x] Next appointment was confirmed    Electronically signed by: Jose Jack PTA

## 2024-02-05 ENCOUNTER — HOSPITAL ENCOUNTER (OUTPATIENT)
Age: 49
Setting detail: THERAPIES SERIES
Discharge: HOME | End: 2024-02-05
Payer: COMMERCIAL

## 2024-02-05 PROCEDURE — 97140 MANUAL THERAPY 1/> REGIONS: CPT

## 2024-02-05 PROCEDURE — 97110 THERAPEUTIC EXERCISES: CPT

## 2024-02-05 NOTE — FLOWSHEET NOTE
with Home Exercise Programs     LTG: (to be met in 12 treatments)  ? Pain: Improved pain levels to 0-1/10 at worst to help with general function  ? ROM: improved flexion to 2/3 distal from patella to ankles, extension to 15 degrees to allow improved functional mobility including tying shoes  ? Function: improved bending ADLs by 90% grossly, improved sitting greater than 60 minutes with minimal to no symptoms  Independent with Home Exercise Programs    Pt. Education:  [x] Plans/Goals, Risks/Benefits discussed  [x] Home exercise program  Method of Education: [x] Verbal  [] Demo  [x] Written  Comprehension of Education:  [x] Verbalizes understanding.  [] Demonstrates understanding.  [] Needs Review.  [] Demonstrates/verbalizes understanding of HEP/Ed previously given.     See exercise sheet for complete review of HEP 1/3/24 Jro    Plan: [x] Continue current frequency toward long and short term goals.    [x] Specific Instructions for subsequent treatments: progress postural stabilization program, work on gluteal and hip stretching program, work on postural education/awareness for lumbar spine     Frequency:  2-3 x/week for 12 visits       Time In: 1415          Time Out: 1515    Electronically signed by:  Jose Jack PTA

## 2024-02-09 ENCOUNTER — HOSPITAL ENCOUNTER (OUTPATIENT)
Age: 49
Setting detail: THERAPIES SERIES
Discharge: HOME | End: 2024-02-09
Payer: COMMERCIAL

## 2024-02-09 PROCEDURE — 97110 THERAPEUTIC EXERCISES: CPT

## 2024-02-09 PROCEDURE — 97140 MANUAL THERAPY 1/> REGIONS: CPT

## 2024-02-09 NOTE — FLOWSHEET NOTE
grossly, improved sitting up to 45 minutes with minimal to no symptoms  Independent with Home Exercise Programs     LTG: (to be met in 12 treatments)  ? Pain: Improved pain levels to 0-1/10 at worst to help with general function  ? ROM: improved flexion to 2/3 distal from patella to ankles, extension to 15 degrees to allow improved functional mobility including tying shoes  ? Function: improved bending ADLs by 90% grossly, improved sitting greater than 60 minutes with minimal to no symptoms  Independent with Home Exercise Programs    Pt. Education:  [x] Plans/Goals, Risks/Benefits discussed  [x] Home exercise program  Method of Education: [x] Verbal  [] Demo  [x] Written  Comprehension of Education:  [x] Verbalizes understanding.  [] Demonstrates understanding.  [] Needs Review.  [] Demonstrates/verbalizes understanding of HEP/Ed previously given.     See exercise sheet for complete review of HEP 1/3/24 Jro    Plan: [x] Continue current frequency toward long and short term goals.    [x] Specific Instructions for subsequent treatments: progress postural stabilization program, work on gluteal and hip stretching program, work on postural education/awareness for lumbar spine     Frequency:  2-3 x/week for 12 visits       Time In: 0845          Time Out: 0945    Electronically signed by:  Jose Jack PTA

## 2024-02-12 ENCOUNTER — HOSPITAL ENCOUNTER (OUTPATIENT)
Age: 49
Setting detail: THERAPIES SERIES
End: 2024-02-12
Payer: COMMERCIAL

## 2024-02-13 ENCOUNTER — HOSPITAL ENCOUNTER (OUTPATIENT)
Age: 49
Setting detail: THERAPIES SERIES
Discharge: HOME | End: 2024-02-13
Payer: COMMERCIAL

## 2024-02-13 PROCEDURE — G0283 ELEC STIM OTHER THAN WOUND: HCPCS

## 2024-02-13 PROCEDURE — 97140 MANUAL THERAPY 1/> REGIONS: CPT

## 2024-02-13 PROCEDURE — 97110 THERAPEUTIC EXERCISES: CPT

## 2024-02-13 NOTE — FLOWSHEET NOTE
[facility] Rehabilitation &  Therapy  [address], [address]  [facility] [address]  [phone]  F: [phone]     Physical Therapy Daily Treatment Note    Date:  2024  Patient Name:  Fuad Neff    :  1975  MRN: [id number]  Physician: Bry Moura DO                                     [facility]30.00 COP[facility]   Medical Diagnosis: low back pain without sciatica M54.50 ADD neck pain M54.2             Rehab Codes: low back pain M54.5  Onset Date: referral 23             Next 's appt.: PRN after therapy    Visit# / total visits:     Cancels/No Shows: 2/0    Subjective:    Pain:  [x] Yes  [] No Location: left low back and buttock 2/10,  (R) trapezius/levator 4-5/10 Pain Rating: (0-10 scale) 2/10 buttock, 4-5/10 (R) trapezius/levator  Pain altered Tx:  [x] No  [] Yes  Action:    Comments:   Pt reports that he is sore today due to being ill again and coughing yesterday.  Drove quite a bit yesterday provoking his left low back. Sx continued to be provoked with current living situation and illness.      Objective:  subscapularis tightness with point-tenderness to medial scapular border, right occipital discomfort with UT and levator stretch.  Reduced subscapular tightness with improved scapular rhythm.        Today’s Treatment:    Modalities: MHP to right shoulder post session for improved muscle flexibility and blood flow to areas treated with manual techniques.     Precautions: N/A    Exercises:   Exercise Reps/ Time Weight/ Level Comments   UBE   6 min     3 min each direction    Postural education  5 min    Lumbar roll   Hamstring stretch supine 4 x 20\"    at stairs    Calf stretch  4 x 20\"   Slant board   Piriformis stretch supine 3 x 20\"       DKTC 10\" x 10              Hook lying march  X 15  black Hip flexion with knee extension TA iso     X 15 black    MET for right SI dysfunction  5 x 5\" bilaterally      Hip ABD 5 x 5\"

## 2024-02-16 ENCOUNTER — HOSPITAL ENCOUNTER (OUTPATIENT)
Age: 49
Setting detail: THERAPIES SERIES
Discharge: HOME | End: 2024-02-16
Payer: COMMERCIAL

## 2024-02-16 PROCEDURE — 97110 THERAPEUTIC EXERCISES: CPT

## 2024-02-16 PROCEDURE — 97140 MANUAL THERAPY 1/> REGIONS: CPT

## 2024-02-16 NOTE — FLOWSHEET NOTE
[facility] Rehabilitation &  Therapy  [address], [address]  [facility] [place] [address]  [phone]  F: [phone]     Physical Therapy Daily Treatment Note    Date:  2024  Patient Name:  Fuad Neff    :  1975  MRN: [id number]  Physician: Bry Moura DO                                     [facility]30.00 COP[facility]   Medical Diagnosis: low back pain without sciatica M54.50 ADD neck pain M54.2             Rehab Codes: low back pain M54.5  Onset Date: referral 23             Next 's appt.: PRN after therapy    Visit# / total visits: 10/12    Cancels/No Shows: 2/0    Subjective:    Pain:  [x] Yes  [] No Location: left low back and buttock 4/10,  (R) trapezius/levator 4/10 Pain Rating: (0-10 scale) 4/10   Pain altered Tx:  [x] No  [] Yes  Action:    Comments:   Pt reports continued right scapular pain that is constant in nature.  Left buttock pain that remains constant but provokes with sitting.  Sx exacerbated by work activities and being sick for the past week-c/o nausea today due to his antibiotic.   Pt states that  he will contact his physician for a follow-up and possible MRI as suggested at his last visit.     Objective:  subscapularis tightness with point-tenderness to medial scapular border, right occipital discomfort with UT and levator stretch.  Reduced subscapular tightness with improved scapular rhythm.      Today’s Treatment:    Modalities: MHP bilateral scapula thoracic spine and bilateral low back post session for improved muscle flexibility and blood flow to areas treated with manual techniques.     Precautions: N/A    Exercises:   Exercise Reps/ Time Weight/ Level Comments   UBE   6 min     3 min each direction    Postural education  5 min    Lumbar roll   Hamstring stretch supine 4 x 20\"    at stairs    Calf stretch  4 x 20\"   Slant board   Piriformis stretch supine 3 x 20\"       DKTC 10\" x 10 NP    Side lying thoracic

## 2024-02-20 ENCOUNTER — HOSPITAL ENCOUNTER (OUTPATIENT)
Age: 49
Setting detail: THERAPIES SERIES
Discharge: HOME | End: 2024-02-20
Payer: COMMERCIAL

## 2024-02-20 PROCEDURE — 97110 THERAPEUTIC EXERCISES: CPT

## 2024-02-20 PROCEDURE — 97140 MANUAL THERAPY 1/> REGIONS: CPT

## 2024-02-20 PROCEDURE — 97032 APPL MODALITY 1+ESTIM EA 15: CPT

## 2024-02-20 NOTE — FLOWSHEET NOTE
[facility] Rehabilitation &  Therapy  [address]  [facility] [address]  [phone]  F: [phone]     Physical Therapy Daily Treatment Note    Date:  2024  Patient Name:  Fuad Neff    :  1975  MRN: [id number]  Physician: Bry Moura DO                                     [facility]30.00 COP[facility]   Medical Diagnosis: low back pain without sciatica M54.50 ADD neck pain M54.2             Rehab Codes: low back pain M54.5  Onset Date: referral 23             Next 's appt.: PRN after therapy    Visit# / total visits:     Cancels/No Shows: 2/0    Subjective:      Pain:  [x] Yes  [] No Location: right medial scapula, left buttock Pain Rating: (0-10 scale) right medial scapula 3-4/10 at rest, 7/10 with palpation,  left buttock 4/10.   Pain altered Tx:  [x] No  [] Yes  Action:    Comments:   Pt reports that his right medial scapular pain was provoked all weekend after skiing, left buttock less provoked.  Sx have decreased today.  Pt has a call into his physician for a follow-up do to ongoing thoracic sx. Thoracic sx come and go with varied intensities      Objective:  T6-T7 hypersensitivity with comparable-sign.  Hypomobility on mid-thoracic spinal levels, minimal muscle tightness/scapular restriction.      Today’s Treatment:    Modalities: MHP with IFC T6-T7 bilaterally high frequency sweep intensity 19 prone x 15 min.     Precautions: N/A    Exercises:   Exercise Reps/ Time Weight/ Level Comments   UBE   6 min     3 min each direction    Postural education  5 min    Lumbar roll   Hamstring stretch supine 4 x 20\"    at stairs    Calf stretch  4 x 20\"   Slant board   Piriformis stretch supine 3 x 20\"       DKTC 10\" x 10 NP    Side lying thoracic rotation   5\" x 10    each direction    Hook lying march  X 15  black Hip flexion with knee extension TA iso   BRIDGES  X 15 black    MET for right SI dysfunction  5 x 5\" bilaterally

## 2024-02-23 ENCOUNTER — HOSPITAL ENCOUNTER (OUTPATIENT)
Age: 49
Setting detail: THERAPIES SERIES
Discharge: HOME | End: 2024-02-23
Payer: COMMERCIAL

## 2024-02-23 PROCEDURE — 97110 THERAPEUTIC EXERCISES: CPT

## 2024-02-23 PROCEDURE — G0283 ELEC STIM OTHER THAN WOUND: HCPCS

## 2024-02-23 NOTE — PROGRESS NOTES
[facility] Rehabilitation &  Therapy  [address], [address]  [facility] [address]  [phone]  F: [phone]     Physical Therapy Daily Treatment Note/Progress Note    Date:  2024  Patient Name:  Fuad Neff    :  1975  MRN: [id number]  Physician: Bry Moura DO                                     Insurance: [employer] 60/60 VISITS $30.00 COPAYMENT   Medical Diagnosis: low back pain without sciatica M54.50 ADD neck pain M54.2             Rehab Codes: low back pain M54.5  Onset Date: referral 23             Next 's appt.: 24    Visit# / total visits:     Cancels/No Shows:     Date range of services: 1/3/24 to 24    Subjective:      Pain:  [x] Yes  [] No Location: right medial scapula/thoracic spine, left buttock Pain Rating: (0-10 scale) right medial scapula 3/10 at rest, can provoke to 7-8/10 insidiously, left buttock 1-2/10.   Pain altered Tx:  [x] No  [] Yes  Action:    Comments:   Pt reports decreased thoracic and left hip sx today.  Hip/buttock sx continue to respond to HEP, thoracic sx fluctuate in intensity but never completely resolve. Pt has a follow-up with his physician 24    Objective:  T6-T7 hypersensitivity with comparable-sign.  Hypomobility on mid-thoracic spinal levels, minimal muscle tightness/scapular restriction.   Oswestry 17/50  34% functional limitation  Trunk flexion finger tips to mid-tibia, lumbar extension 20 degrees     Cervical mobility 75 degrees (B) without pain in the (R); SB 25 degrees (B) more difficult to the (R) with (R) trapezius pain/crunch per patient.  55 degrees flexion, 55 degrees extension.  Trunk rotation 65 degrees (L), (R) 55 degrees without pain-   Oswestry 17/50 34% limited- JRO     Today’s Treatment:    Modalities: MHP with IFC T6-T7 bilaterally high frequency sweep intensity 17 prone x 15 min.     Precautions: N/A    Exercises:   Exercise Reps/ Time Weight/ Level Comments   UBE   6 min

## 2024-09-25 ENCOUNTER — OFFICE VISIT (OUTPATIENT)
Dept: ORTHOPEDIC SURGERY | Age: 49
End: 2024-09-25
Payer: COMMERCIAL

## 2024-09-25 VITALS — RESPIRATION RATE: 16 BRPM | WEIGHT: 215 LBS | HEIGHT: 72 IN | BODY MASS INDEX: 29.12 KG/M2

## 2024-09-25 DIAGNOSIS — M25.521 RIGHT ELBOW PAIN: ICD-10-CM

## 2024-09-25 DIAGNOSIS — M25.511 RIGHT SHOULDER PAIN, UNSPECIFIED CHRONICITY: Primary | ICD-10-CM

## 2024-09-25 PROCEDURE — 99203 OFFICE O/P NEW LOW 30 MIN: CPT | Performed by: ORTHOPAEDIC SURGERY

## 2024-09-25 NOTE — PROGRESS NOTES
ORTHOPEDIC PATIENT EVALUATION      HPI / Chief Complaint  Fuad Neff is a 49 y.o. male who presents for evaluation of his right shoulder and arm.  He indicates that he fell off a ladder at home 2 years ago and has been dealing with nonstop pain ever since.  He describes having pain in his neck and shoulder extending down the arm.  He states that he has been through extensive physical therapy and finally his insurance wanted him to have additional assessment.  Consequently he was seen by spine surgeon who ultimately felt that the majority of his pain is from his shoulder and so he presents today for further evaluation and treatment.  He describes having initially a lot of muscle spasms between the shoulder and his neck.  This has gradually gotten better.  His right shoulder pain though persist.  Again he states that it is constant.  He localizes it to the posterolateral corner of the shoulder as well as underneath his shoulder blade.  It is worse with movement especially any attempts at reaching overhead.  He does describe some radicular properties the extension down the entire arm into the ulnar 2 digits.    Past Medical History  Fuad  has a past medical history of Kidney stones and Umbilical hernia.    Past Surgical History  Fuad  has a past surgical history that includes Tonsillectomy and pr rpr umbilical hrna 5 yrs/> reducible (N/A, 11/27/2017).    Current Medications  Current Outpatient Medications   Medication Sig Dispense Refill    traMADol (ULTRAM) 50 MG tablet TAKE 1 TABLET BY MOUTH EVERY 6 HOURS AS NEEDED FOR PAIN FOR 3 DAYS  0     No current facility-administered medications for this visit.       Allergies  Allergies have been reviewed.  Fuad is allergic to gluten meal and motrin [ibuprofen].    Social History  Fuad  reports that he has never smoked. He has never used smokeless tobacco. He reports that he does not drink alcohol and does not use drugs.    Family History  Fuad's family history is not

## 2024-10-22 ENCOUNTER — INITIAL CONSULT (OUTPATIENT)
Dept: PAIN MANAGEMENT | Age: 49
End: 2024-10-22
Payer: COMMERCIAL

## 2024-10-22 VITALS — BODY MASS INDEX: 31.15 KG/M2 | HEIGHT: 72 IN | WEIGHT: 230 LBS

## 2024-10-22 DIAGNOSIS — M54.12 CERVICAL RADICULITIS: ICD-10-CM

## 2024-10-22 DIAGNOSIS — G89.29 CHRONIC NECK PAIN: Primary | ICD-10-CM

## 2024-10-22 DIAGNOSIS — M54.2 CHRONIC NECK PAIN: Primary | ICD-10-CM

## 2024-10-22 PROCEDURE — 99204 OFFICE O/P NEW MOD 45 MIN: CPT | Performed by: ANESTHESIOLOGY

## 2024-10-22 ASSESSMENT — ENCOUNTER SYMPTOMS
ALLERGIC/IMMUNOLOGIC NEGATIVE: 1
GASTROINTESTINAL NEGATIVE: 1
BACK PAIN: 1
EYES NEGATIVE: 1
RESPIRATORY NEGATIVE: 1

## 2024-10-22 NOTE — PROGRESS NOTES
Aching throbbing sensation going on for more than 2 years  The patient is a 49 y.o. /  male.    Chief Complaint   Patient presents with    New Patient     Shoulder pain        HPI    Pain History 49-year-old man complaining of chronic pain located over the right side of the neck extends over the back of the shoulder and the shoulder blade and spasms running down the right arm all the way to the finger  No changes in bladder or bowel control  Was evaluated by orthopedic surgeons including Dr. Mandujano and Dr. Hobbs  Patient did physical therapy earlier this year  No significant improvement  Have tried NSAIDs and muscle relaxants  Onset of symptoms 2 years ago related to a fall  No previous shoulder or cervical spine surgical history  No previous MRI cervical spine    Pain score today: 4   1. Location: Right shoulder    2. Radiation: down the arm to hand   3. Character: aching, throbbing, shooting and sharp   5. Duration:    6. Onset: 2 years ago  7. Did an injury cause pain: fall  8. Aggravating factors: ADLs  9. Alleviating factors:  ice   10. Associated symptoms (numbness / tingling / weakness): weakness   -Where at:    -Down into finger tips or toes (specify which finger or toes):    -constant or intermitting:  constant   11. Red Flags: (weight loss / chills / loss of bladder or bowel control):  no     Previous management history  1. Previous diagnostic workup: (Imaging/EMG)   CT, MRI, or Xray: Xray   What part of the body: Right shoulder  What facility did they have it at: [facility]  What year or specific date: 09/30/2024  EMG:  no     2. Previous non interventional treatments tried:  chiropractor or physical therapy: PT  What part of the body: shoulder    What facility was it done at:  in [place]   How long ago was it last tried: April 2024  Did it work: yes  Did they complete it: yes     3. Previous Medications tried  NSAID's: yes  Neurontin: no  Lyrica: no  Trycyclic antidepressant (Ellavil / Pamelor

## 2024-11-23 DIAGNOSIS — M54.2 CHRONIC NECK PAIN: ICD-10-CM

## 2024-11-23 DIAGNOSIS — M54.12 CERVICAL RADICULITIS: ICD-10-CM

## 2024-11-23 DIAGNOSIS — G89.29 CHRONIC NECK PAIN: ICD-10-CM

## 2024-12-29 DIAGNOSIS — M54.2 CHRONIC NECK PAIN: ICD-10-CM

## 2024-12-29 DIAGNOSIS — G89.29 CHRONIC NECK PAIN: ICD-10-CM

## 2024-12-29 DIAGNOSIS — M54.12 CERVICAL RADICULITIS: ICD-10-CM

## 2025-01-08 ENCOUNTER — OFFICE VISIT (OUTPATIENT)
Dept: ORTHOPEDIC SURGERY | Age: 50
End: 2025-01-08
Payer: COMMERCIAL

## 2025-01-08 VITALS — BODY MASS INDEX: 31.15 KG/M2 | WEIGHT: 230 LBS | RESPIRATION RATE: 14 BRPM | HEIGHT: 72 IN

## 2025-01-08 DIAGNOSIS — M25.531 RIGHT WRIST PAIN: Primary | ICD-10-CM

## 2025-01-08 PROCEDURE — 99213 OFFICE O/P EST LOW 20 MIN: CPT | Performed by: ORTHOPAEDIC SURGERY

## 2025-01-08 NOTE — PROGRESS NOTES
ORTHOPEDIC PATIENT EVALUATION      HPI / Chief Complaint  Fuad Neff is a 49 y.o. male who presents for evaluation of his right wrist.  On 1/3/2025 he indicates that he was trying to rush his son who had fallen and struck his head on the ice to the car so they can get him to the hospital when he tripped over his son and fell forward.  He tried to break his fall with outstretched arms but hurt his right wrist in the process.  He was seen at the same time as his son in the emergency department at an outside facility and placed in a removable wrist brace.  He presents today for further evaluation and treatment.  His pain is primarily localized to the DRUJ and the radial aspect of the wrist.  No numbness or tingling.  He states that he was quite swollen but this has gradually gotten better.    Past Medical History  Fuad  has a past medical history of Kidney stones and Umbilical hernia.    Past Surgical History  Fuad  has a past surgical history that includes Tonsillectomy and pr rpr umbilical hrna 5 yrs/> reducible (N/A, 11/27/2017).    Current Medications  Current Outpatient Medications   Medication Sig Dispense Refill    traMADol (ULTRAM) 50 MG tablet TAKE 1 TABLET BY MOUTH EVERY 6 HOURS AS NEEDED FOR PAIN FOR 3 DAYS  0     No current facility-administered medications for this visit.       Allergies  Allergies have been reviewed.  Fuad is allergic to gluten meal and motrin [ibuprofen].    Social History  Fuad  reports that he has never smoked. He has never used smokeless tobacco. He reports that he does not drink alcohol and does not use drugs.    Family History  Fuad's family history is not on file.      Review of Systems   History obtained from the patient.   REVIEW OF SYSTEMS:   Constitution: negative for fever, chills, weight loss or malaise   Musculoskeletal: As noted in the HPI   Neurologic: As noted in the HPI    Physical Exam  Resp 14   Ht 1.829 m (6' 0.01\")   Wt 104.3 kg (230 lb)   BMI 31.19 kg/m²

## 2025-01-13 ENCOUNTER — HOSPITAL ENCOUNTER (OUTPATIENT)
Dept: CT IMAGING | Age: 50
Discharge: HOME | End: 2025-01-15
Attending: ORTHOPAEDIC SURGERY
Payer: COMMERCIAL

## 2025-01-13 DIAGNOSIS — M25.531 RIGHT WRIST PAIN: ICD-10-CM

## 2025-01-13 PROCEDURE — 73200 CT UPPER EXTREMITY W/O DYE: CPT

## 2025-01-15 ENCOUNTER — OFFICE VISIT (OUTPATIENT)
Dept: ORTHOPEDIC SURGERY | Age: 50
End: 2025-01-15
Payer: COMMERCIAL

## 2025-01-15 VITALS — HEIGHT: 72 IN | WEIGHT: 230 LBS | BODY MASS INDEX: 31.15 KG/M2 | RESPIRATION RATE: 14 BRPM

## 2025-01-15 DIAGNOSIS — M25.531 RIGHT WRIST PAIN: Primary | ICD-10-CM

## 2025-01-15 PROCEDURE — 99212 OFFICE O/P EST SF 10 MIN: CPT | Performed by: ORTHOPAEDIC SURGERY

## 2025-01-15 NOTE — PROGRESS NOTES
HPI: Mr. Neff is a 49-year-old gentleman here today to review the results of his right wrist CT scan which was completed on 1/13/2025.  I did review the images independently with the patient and it demonstrates no acute osseous abnormality.  No obvious fracture, dislocation or subluxation.  I had a discussion with the patient today about this.  I anticipate that his pain will gradually get better given time.  He is encouraged to continue to wear his thumb spica wrist brace take it off at rest and at least 3-4 times a day to work on his motion.  He can wean out of it as his pain allows.  He was provided a prescription for occupational therapy to facilitate improvement in pain and function.  He states that he has been previously prescribed an NSAID and still has some of this.  He can certainly take this to alleviate pain and inflammation.  I anticipate gradual improvement in complete resolution of his pain given time and so I will see him back in my clinic as needed but he was encouraged to return or call at anytime with persistent or worsening symptoms and with any questions or concerns.

## (undated) DEVICE — HYPODERMIC SAFETY NEEDLE: Brand: MAGELLAN

## (undated) DEVICE — 3M™ WARMING BLANKET, UPPER BODY, 10 PER CASE, 42268: Brand: BAIR HUGGER™

## (undated) DEVICE — GAUZE,SPONGE,4"X4",16PLY,XRAY,STRL,LF: Brand: MEDLINE

## (undated) DEVICE — GOWN,AURORA,NONREINFORCED,LARGE: Brand: MEDLINE

## (undated) DEVICE — SUTURE VCRL + SZ 4-0 L18IN ABSRB UD L19MM PS-2 3/8 CIR PRIM VCP496H

## (undated) DEVICE — STERILE COTTON BALLS LARGE 5/P: Brand: MEDLINE

## (undated) DEVICE — CATH URETH 24FR DOVER RED LTX

## (undated) DEVICE — 3M™ TEGADERM™ TRANSPARENT FILM DRESSING FRAME STYLE, 1626W, 4 IN X 4-3/4 IN (10 CM X 12 CM), 50/CT 4CT/CASE: Brand: 3M™ TEGADERM™

## (undated) DEVICE — ST CHARLES MINOR ABDOMINAL PK: Brand: MEDLINE INDUSTRIES, INC.

## (undated) DEVICE — SUTURE VCRL SZ 0 L36IN ABSRB UD CT-1 L36MM 1/2 CIR TAPR PNT VCP946H

## (undated) DEVICE — SOLUTION IV 1000ML 0.9% SOD CHL PH 5 INJ USP VIAFLX PLAS

## (undated) DEVICE — PACK,LAPAROTOMY,NO GOWNS: Brand: MEDLINE

## (undated) DEVICE — GLOVE SURG SZ 65 THK91MIL LTX FREE SYN POLYISOPRENE

## (undated) DEVICE — SPONGE GZ W4XL4IN RAYON POLY FILL CVR W/ NONWOVEN FAB

## (undated) DEVICE — GLOVE SURG SZ 65 STD WHT LTX SYN POLYMER BEAD REINF ANTI RL

## (undated) DEVICE — SINGLE PORT MANIFOLD: Brand: NEPTUNE 2

## (undated) DEVICE — STRIP,CLOSURE,WOUND,MEDI-STRIP,1/2X4: Brand: MEDLINE